# Patient Record
Sex: FEMALE | Race: WHITE
[De-identification: names, ages, dates, MRNs, and addresses within clinical notes are randomized per-mention and may not be internally consistent; named-entity substitution may affect disease eponyms.]

---

## 2020-08-25 ENCOUNTER — HOSPITAL ENCOUNTER (EMERGENCY)
Dept: HOSPITAL 92 - ERS | Age: 45
LOS: 1 days | Discharge: HOME | End: 2020-08-26
Payer: SELF-PAY

## 2020-08-25 DIAGNOSIS — F17.210: ICD-10-CM

## 2020-08-25 DIAGNOSIS — R55: Primary | ICD-10-CM

## 2020-08-25 DIAGNOSIS — Z79.899: ICD-10-CM

## 2020-08-25 DIAGNOSIS — R10.84: ICD-10-CM

## 2020-08-25 DIAGNOSIS — F43.10: ICD-10-CM

## 2020-08-25 LAB
ALBUMIN SERPL BCG-MCNC: 4.4 G/DL (ref 3.5–5)
ALP SERPL-CCNC: 56 U/L (ref 40–110)
ALT SERPL W P-5'-P-CCNC: 10 U/L (ref 8–55)
ANION GAP SERPL CALC-SCNC: 17 MMOL/L (ref 10–20)
AST SERPL-CCNC: 18 U/L (ref 5–34)
BASOPHILS # BLD AUTO: 0.1 THOU/UL (ref 0–0.2)
BASOPHILS NFR BLD AUTO: 1 % (ref 0–1)
BILIRUB SERPL-MCNC: 0.2 MG/DL (ref 0.2–1.2)
BUN SERPL-MCNC: 11 MG/DL (ref 7–18.7)
CALCIUM SERPL-MCNC: 9.2 MG/DL (ref 7.8–10.44)
CHLORIDE SERPL-SCNC: 109 MMOL/L (ref 98–107)
CK SERPL-CCNC: 60 U/L (ref 29–168)
CO2 SERPL-SCNC: 20 MMOL/L (ref 22–29)
CREAT CL PREDICTED SERPL C-G-VRATE: 0 ML/MIN (ref 70–130)
EOSINOPHIL # BLD AUTO: 0.3 THOU/UL (ref 0–0.7)
EOSINOPHIL NFR BLD AUTO: 3.2 % (ref 0–10)
GLOBULIN SER CALC-MCNC: 2.9 G/DL (ref 2.4–3.5)
GLUCOSE SERPL-MCNC: 81 MG/DL (ref 70–105)
HGB BLD-MCNC: 14.7 G/DL (ref 12–16)
LYMPHOCYTES # BLD: 3.7 THOU/UL (ref 1.2–3.4)
LYMPHOCYTES NFR BLD AUTO: 43.2 % (ref 21–51)
MCH RBC QN AUTO: 35.3 PG (ref 27–31)
MCV RBC AUTO: 104 FL (ref 78–98)
MONOCYTES # BLD AUTO: 0.5 THOU/UL (ref 0.11–0.59)
MONOCYTES NFR BLD AUTO: 6.1 % (ref 0–10)
NEUTROPHILS # BLD AUTO: 4 THOU/UL (ref 1.4–6.5)
NEUTROPHILS NFR BLD AUTO: 46.6 % (ref 42–75)
PLATELET # BLD AUTO: 251 THOU/UL (ref 130–400)
POTASSIUM SERPL-SCNC: 3.7 MMOL/L (ref 3.5–5.1)
PREGU CONTROL BACKGROUND?: (no result)
PREGU CONTROL BAR APPEAR?: YES
RBC # BLD AUTO: 4.16 MILL/UL (ref 4.2–5.4)
SODIUM SERPL-SCNC: 142 MMOL/L (ref 136–145)
SP GR UR STRIP: 1 (ref 1–1.04)
TROPONIN I SERPL DL<=0.01 NG/ML-MCNC: 0.02 NG/ML (ref ?–0.03)
WBC # BLD AUTO: 8.7 THOU/UL (ref 4.8–10.8)

## 2020-08-25 PROCEDURE — 85025 COMPLETE CBC W/AUTO DIFF WBC: CPT

## 2020-08-25 PROCEDURE — 74177 CT ABD & PELVIS W/CONTRAST: CPT

## 2020-08-25 PROCEDURE — 80053 COMPREHEN METABOLIC PANEL: CPT

## 2020-08-25 PROCEDURE — 82550 ASSAY OF CK (CPK): CPT

## 2020-08-25 PROCEDURE — 36415 COLL VENOUS BLD VENIPUNCTURE: CPT

## 2020-08-25 PROCEDURE — 96365 THER/PROPH/DIAG IV INF INIT: CPT

## 2020-08-25 PROCEDURE — 96361 HYDRATE IV INFUSION ADD-ON: CPT

## 2020-08-25 PROCEDURE — 84484 ASSAY OF TROPONIN QUANT: CPT

## 2020-08-25 PROCEDURE — 83605 ASSAY OF LACTIC ACID: CPT

## 2020-08-25 PROCEDURE — 81025 URINE PREGNANCY TEST: CPT

## 2020-08-25 PROCEDURE — 96375 TX/PRO/DX INJ NEW DRUG ADDON: CPT

## 2020-08-25 PROCEDURE — 81003 URINALYSIS AUTO W/O SCOPE: CPT

## 2020-08-25 PROCEDURE — 93005 ELECTROCARDIOGRAM TRACING: CPT

## 2020-08-25 NOTE — CT
CT Abdomen Pelvis W Con: 8/25/2020 8:35 PM



CLINICAL INFORMATION: Diffuse abdominal pain and frequent diarrhea; history of Crohn's disease



COMPARISON: None.



TECHNIQUE: 

Multiple contiguous axial images were obtained and a CT of the abdomen and pelvis with IV contrast. C
oronal and sagittal reformats were performed.



FINDINGS:



Lower Chest: within normal limits.



Abdomen:

Liver: Benign-appearing calcifications likely representing calcified granulomas.

Bile Ducts: Normal caliber.

Gallbladder: No calcified gallstones. Normal caliber wall.

Pancreas: within normal limits.

Spleen: A benign-appearing calcification likely represents a calcified granuloma.

Adrenals: within normal limits.

Kidneys: Hypodensities in the right kidney measuring up to 1.2 cm in size represent cysts.



Pelvis:

Reproductive Organs: No pelvic masses.

Ureters: within normal limits.

Bladder: within normal limits.



Peritoneum: No ascites or free air, no fluid collection.

Bowel: Normal caliber. An anastomotic staple line is seen along the cecum. No appendix is identified.


Mesentery and Retroperitoneum: No enlarged mesenteric or retroperitoneal lymph nodes.

Vessels: Atherosclerotic calcifications. 

Abdominal Wall: within normal limits.

Bones: Within normal limits  



IMPRESSION:





1. No evidence of acute intraabdominal or pelvic abnormality.

2. Right renal cysts



Reported By: Sebastien Granger 

Electronically Signed:  8/25/2020 11:22 PM

## 2020-08-29 NOTE — EKG
Test Reason : 

Blood Pressure : ***/*** mmHG

Vent. Rate : 132 BPM     Atrial Rate : 132 BPM

   P-R Int : 140 ms          QRS Dur : 096 ms

    QT Int : 308 ms       P-R-T Axes : 040 054 035 degrees

   QTc Int : 456 ms

 

Sinus tachycardia

Otherwise normal ECG

 

Confirmed by ZOIE PEÑA, CELESTINE NIX ([id number]),  NBA NUNEZ ([id number]) on 8/29/2020 2:47:17 PM

 

Referred By:             Confirmed By:CELESTINE LINO MD